# Patient Record
Sex: FEMALE | Race: WHITE | NOT HISPANIC OR LATINO
[De-identification: names, ages, dates, MRNs, and addresses within clinical notes are randomized per-mention and may not be internally consistent; named-entity substitution may affect disease eponyms.]

---

## 2017-01-19 ENCOUNTER — APPOINTMENT (OUTPATIENT)
Dept: ENDOCRINOLOGY | Facility: CLINIC | Age: 52
End: 2017-01-19

## 2017-01-19 VITALS
HEIGHT: 61.5 IN | HEART RATE: 96 BPM | DIASTOLIC BLOOD PRESSURE: 88 MMHG | BODY MASS INDEX: 29.45 KG/M2 | SYSTOLIC BLOOD PRESSURE: 133 MMHG | WEIGHT: 158 LBS

## 2017-01-19 DIAGNOSIS — Z86.59 PERSONAL HISTORY OF OTHER MENTAL AND BEHAVIORAL DISORDERS: ICD-10-CM

## 2017-01-27 LAB
T4 FREE SERPL-MCNC: 1.2 NG/DL
TSH SERPL-ACNC: 1.03 UIU/ML

## 2017-04-07 ENCOUNTER — APPOINTMENT (OUTPATIENT)
Dept: ENDOCRINOLOGY | Facility: CLINIC | Age: 52
End: 2017-04-07

## 2017-07-20 ENCOUNTER — APPOINTMENT (OUTPATIENT)
Dept: ENDOCRINOLOGY | Facility: CLINIC | Age: 52
End: 2017-07-20

## 2017-07-20 VITALS
DIASTOLIC BLOOD PRESSURE: 78 MMHG | SYSTOLIC BLOOD PRESSURE: 115 MMHG | HEART RATE: 66 BPM | BODY MASS INDEX: 29 KG/M2 | WEIGHT: 156 LBS

## 2017-08-04 LAB
25(OH)D3 SERPL-MCNC: 24.8 NG/ML
ALBUMIN SERPL ELPH-MCNC: 4.2 G/DL
ALP BLD-CCNC: 54 U/L
ALT SERPL-CCNC: 12 U/L
ANION GAP SERPL CALC-SCNC: 13 MMOL/L
AST SERPL-CCNC: 16 U/L
BASOPHILS # BLD AUTO: 0.05 K/UL
BASOPHILS NFR BLD AUTO: 0.5 %
BILIRUB SERPL-MCNC: 0.7 MG/DL
BUN SERPL-MCNC: 13 MG/DL
CALCIUM SERPL-MCNC: 9.4 MG/DL
CHLORIDE SERPL-SCNC: 104 MMOL/L
CHOLEST SERPL-MCNC: 230 MG/DL
CHOLEST/HDLC SERPL: 3.6 RATIO
CO2 SERPL-SCNC: 24 MMOL/L
CREAT SERPL-MCNC: 0.82 MG/DL
EOSINOPHIL # BLD AUTO: 0.17 K/UL
EOSINOPHIL NFR BLD AUTO: 1.6 %
FOLATE SERPL-MCNC: >20 NG/ML
GLUCOSE SERPL-MCNC: 92 MG/DL
HBA1C MFR BLD HPLC: 5 %
HCT VFR BLD CALC: 43.2 %
HDLC SERPL-MCNC: 64 MG/DL
HGB BLD-MCNC: 14.2 G/DL
IMM GRANULOCYTES NFR BLD AUTO: 0.2 %
IRON SATN MFR SERPL: 44 %
IRON SERPL-MCNC: 138 UG/DL
LDLC SERPL CALC-MCNC: 143 MG/DL
LYMPHOCYTES # BLD AUTO: 2.13 K/UL
LYMPHOCYTES NFR BLD AUTO: 20.6 %
MAN DIFF?: NORMAL
MCHC RBC-ENTMCNC: 32.6 PG
MCHC RBC-ENTMCNC: 32.9 GM/DL
MCV RBC AUTO: 99.1 FL
MONOCYTES # BLD AUTO: 0.7 K/UL
MONOCYTES NFR BLD AUTO: 6.8 %
NEUTROPHILS # BLD AUTO: 7.29 K/UL
NEUTROPHILS NFR BLD AUTO: 70.3 %
PLATELET # BLD AUTO: 277 K/UL
POTASSIUM SERPL-SCNC: 5.1 MMOL/L
PROT SERPL-MCNC: 6.9 G/DL
RBC # BLD: 4.36 M/UL
RBC # FLD: 13.5 %
SODIUM SERPL-SCNC: 141 MMOL/L
T4 FREE SERPL-MCNC: 1.4 NG/DL
TIBC SERPL-MCNC: 316 UG/DL
TRIGL SERPL-MCNC: 115 MG/DL
TSH SERPL-ACNC: 1.02 UIU/ML
UIBC SERPL-MCNC: 178 UG/DL
VIT B12 SERPL-MCNC: 599 PG/ML
WBC # FLD AUTO: 10.36 K/UL

## 2017-12-04 ENCOUNTER — OTHER (OUTPATIENT)
Age: 52
End: 2017-12-04

## 2018-01-22 ENCOUNTER — LABORATORY RESULT (OUTPATIENT)
Age: 53
End: 2018-01-22

## 2018-01-22 ENCOUNTER — APPOINTMENT (OUTPATIENT)
Dept: ENDOCRINOLOGY | Facility: CLINIC | Age: 53
End: 2018-01-22
Payer: COMMERCIAL

## 2018-01-22 VITALS
SYSTOLIC BLOOD PRESSURE: 122 MMHG | DIASTOLIC BLOOD PRESSURE: 84 MMHG | WEIGHT: 160.25 LBS | BODY MASS INDEX: 29.87 KG/M2 | HEIGHT: 61.5 IN | HEART RATE: 78 BPM

## 2018-01-22 PROCEDURE — 99213 OFFICE O/P EST LOW 20 MIN: CPT | Mod: 25

## 2018-01-22 PROCEDURE — 36415 COLL VENOUS BLD VENIPUNCTURE: CPT

## 2018-02-02 LAB
25(OH)D3 SERPL-MCNC: 26.4 NG/ML
T3FREE SERPL-MCNC: 2.53 PG/ML
T4 FREE SERPL-MCNC: 1.5 NG/DL
THYROGLOB AB SERPL-ACNC: 24.6 IU/ML
THYROGLOB SERPL-MCNC: 255 NG/ML
TSH SERPL-ACNC: 0.94 UIU/ML
TSI ACT/NOR SER: 39 %

## 2018-02-02 RX ORDER — LEVOTHYROXINE SODIUM 0.07 MG/1
75 TABLET ORAL DAILY
Qty: 90 | Refills: 1 | Status: ACTIVE | COMMUNITY
Start: 2017-12-04 | End: 1900-01-01

## 2018-07-16 ENCOUNTER — APPOINTMENT (OUTPATIENT)
Dept: ENDOCRINOLOGY | Facility: CLINIC | Age: 53
End: 2018-07-16

## 2019-01-28 ENCOUNTER — APPOINTMENT (OUTPATIENT)
Dept: ENDOCRINOLOGY | Facility: CLINIC | Age: 54
End: 2019-01-28
Payer: COMMERCIAL

## 2019-01-28 VITALS
HEART RATE: 84 BPM | HEIGHT: 61.5 IN | DIASTOLIC BLOOD PRESSURE: 87 MMHG | BODY MASS INDEX: 28.14 KG/M2 | SYSTOLIC BLOOD PRESSURE: 137 MMHG | WEIGHT: 151 LBS

## 2019-01-28 PROCEDURE — 99214 OFFICE O/P EST MOD 30 MIN: CPT

## 2019-02-02 ENCOUNTER — TRANSCRIPTION ENCOUNTER (OUTPATIENT)
Age: 54
End: 2019-02-02

## 2019-02-08 ENCOUNTER — RX RENEWAL (OUTPATIENT)
Age: 54
End: 2019-02-08

## 2019-02-08 LAB
25(OH)D3 SERPL-MCNC: 24.7 NG/ML
ALBUMIN SERPL ELPH-MCNC: 4.3 G/DL
ALP BLD-CCNC: 66 U/L
ALT SERPL-CCNC: 8 U/L
ANION GAP SERPL CALC-SCNC: 12 MMOL/L
AST SERPL-CCNC: 12 U/L
BASOPHILS # BLD AUTO: 0.07 K/UL
BASOPHILS NFR BLD AUTO: 1.2 %
BILIRUB SERPL-MCNC: 0.2 MG/DL
BUN SERPL-MCNC: 18 MG/DL
CALCIUM SERPL-MCNC: 9.6 MG/DL
CHLORIDE SERPL-SCNC: 103 MMOL/L
CO2 SERPL-SCNC: 28 MMOL/L
CREAT SERPL-MCNC: 0.84 MG/DL
EOSINOPHIL # BLD AUTO: 0.11 K/UL
EOSINOPHIL NFR BLD AUTO: 1.9 %
GLUCOSE SERPL-MCNC: 99 MG/DL
HCT VFR BLD CALC: 45.5 %
HGB BLD-MCNC: 14.4 G/DL
IMM GRANULOCYTES NFR BLD AUTO: 0.2 %
LYMPHOCYTES # BLD AUTO: 1.75 K/UL
LYMPHOCYTES NFR BLD AUTO: 29.5 %
MAN DIFF?: NORMAL
MCHC RBC-ENTMCNC: 31.6 GM/DL
MCHC RBC-ENTMCNC: 32.1 PG
MCV RBC AUTO: 101.3 FL
MONOCYTES # BLD AUTO: 0.41 K/UL
MONOCYTES NFR BLD AUTO: 6.9 %
NEUTROPHILS # BLD AUTO: 3.58 K/UL
NEUTROPHILS NFR BLD AUTO: 60.3 %
PLATELET # BLD AUTO: 306 K/UL
POTASSIUM SERPL-SCNC: 4.3 MMOL/L
PROT SERPL-MCNC: 6.7 G/DL
RBC # BLD: 4.49 M/UL
RBC # FLD: 13.4 %
SODIUM SERPL-SCNC: 143 MMOL/L
T4 FREE SERPL-MCNC: 1.3 NG/DL
TSH SERPL-ACNC: 1.47 UIU/ML
WBC # FLD AUTO: 5.93 K/UL
ZINC SERPL-MCNC: 76 UG/DL

## 2019-08-13 ENCOUNTER — OTHER (OUTPATIENT)
Age: 54
End: 2019-08-13

## 2020-02-20 ENCOUNTER — APPOINTMENT (OUTPATIENT)
Dept: ENDOCRINOLOGY | Facility: CLINIC | Age: 55
End: 2020-02-20
Payer: COMMERCIAL

## 2020-02-20 ENCOUNTER — TRANSCRIPTION ENCOUNTER (OUTPATIENT)
Age: 55
End: 2020-02-20

## 2020-02-20 VITALS
BODY MASS INDEX: 29.26 KG/M2 | HEIGHT: 61.5 IN | WEIGHT: 157 LBS | SYSTOLIC BLOOD PRESSURE: 125 MMHG | HEART RATE: 66 BPM | DIASTOLIC BLOOD PRESSURE: 81 MMHG

## 2020-02-20 DIAGNOSIS — N95.1 MENOPAUSAL AND FEMALE CLIMACTERIC STATES: ICD-10-CM

## 2020-02-20 PROCEDURE — 99214 OFFICE O/P EST MOD 30 MIN: CPT

## 2020-02-20 NOTE — REVIEW OF SYSTEMS
[Fatigue] : fatigue [Negative] : Psychiatric [Palpitations] : no palpitations [Headache] : no headaches [Joint Pain] : no joint pain [Polyuria] : no polyuria [Tremors] : no tremors

## 2020-02-20 NOTE — ASSESSMENT
[Levothyroxine] : The patient was instructed to take Levothyroxine on an empty stomach, separate from vitamins, and wait at least 30 minutes before eating [FreeTextEntry1] : 52 y/o history\par #1-  hypothyroidism,  she's clinically euthyroid however she is complaining of increased tiredness this might be related to insomnia and increased level of the stress at work.\par She noted hair thinning for the past couple of months\par Check levels of thyroid, send a CBC, and Zinc\par She is on Synthroid where review of laboratory results before next prescription\par \par #2- Right 1.7 cm cystic nodule\par No signs of upper respiratory obstruction, she appears euthyroid clinically\par Order a thyroid ultrasound\par \par

## 2020-02-20 NOTE — HISTORY OF PRESENT ILLNESS
[FreeTextEntry1] : 54 y/o female , Hashimoto's hypothyroidism dx in 2014\par Menopause, no history of bone fracture\par recently diagnosed with high blood pressure prescribed Norvasc\par \par She's complaining of increased level of tiredness and hair thinning since Nov 2018\par She's also complaining of insomnia, and weight gain\par She's too busy at work\par Denies swallowing difficulties, voice changes\par Medicines Synthroid 75 mcg, at times she takes multivitamins\par \par 12/11/15, thyroid u/s : Heterogeneous pattern, no nodules\par 1/19/17 , t4 free 1.2, tsh 1.03\par 1/22/18 free T4-1 0.5, TPO 44 and vitamin D 26.4

## 2020-02-21 PROBLEM — N95.1 PERIMENOPAUSAL: Status: ACTIVE | Noted: 2020-02-21

## 2020-02-21 LAB
25(OH)D3 SERPL-MCNC: 24.2 NG/ML
CHOLEST SERPL-MCNC: 238 MG/DL
CHOLEST/HDLC SERPL: 4.1 RATIO
HDLC SERPL-MCNC: 59 MG/DL
LDLC SERPL CALC-MCNC: 158 MG/DL
T4 FREE SERPL-MCNC: 1.3 NG/DL
TRIGL SERPL-MCNC: 108 MG/DL
TSH SERPL-ACNC: 1.34 UIU/ML

## 2020-02-21 NOTE — ADDENDUM
[FreeTextEntry1] : I, Vanessa Kohli, acted solely as a scribe for Dr. Dwight Hargrove on this date. 02/20/2020.

## 2020-02-21 NOTE — PHYSICAL EXAM
[Alert] : alert [Normal Sclera/Conjunctiva] : normal sclera/conjunctiva [Clear to Auscultation] : lungs were clear to auscultation bilaterally [No Edema] : there was no peripheral edema [Not Distended] : not distended [No Rash] : no rash [No Stigmata of Cushings Syndrome] : no stigmata of cushings syndrome [No Tremors] : no tremors [Oriented x3] : oriented to person, place, and time [Normal Outer Ear/Nose] : the ears and nose were normal in appearance [No Respiratory Distress] : no respiratory distress [Normal Rate] : heart rate was normal  [Normal Bowel Sounds] : normal bowel sounds [Regular Rhythm] : with a regular rhythm [Normal S1, S2] : normal S1 and S2 [Acanthosis Nigricans] : no acanthosis nigricans [de-identified] : mass palpated in R lobe

## 2020-02-21 NOTE — HISTORY OF PRESENT ILLNESS
[FreeTextEntry1] : - 12/11/15 Thyroid US: Heterogeneous pattern, no nodules\par - 1/19/17: TSH 1.03, Free T4 1.2, Cholesterol 230, LDL-c 143\par - 1/22/18: TSH 0.94, Free T4 1.5, TPO 44, Vit D 25-OH 26.4 \par - 1/28/19: TSH 1.47, Free T4 1.3, s.creat 0.84, Vit D 25-OH 24.7\par \par 53 y/o F pt, with Hx of Hypothyroidism (dx in 2014), with elevated total cholesterol level 230 on 1/19/2017.\par Other PMHx: HTN\par No Hx of bone fracture.\par SHx: Non-smoker\par Last PCP visit: recently; she was informed that her BP was elevated \par \par 02/20/2020 \par Pt presents today for thyroid f/u,feeling generally well with c/o cold intolerance, hot flushes, night sweats, and occasional diarrhea. Pt reports that she still gets her menses but her recent menstrual periods has been inconsistent. Pt is not consistent with taking Vitamin D supplements. Her PCP reportedly ordered labs on 1/2020 and she was informed that they were all normal.  \par Denies swallowing difficulties, voice changes, and palpitations. \par \par Current  Medications: Synthroid

## 2020-02-21 NOTE — END OF VISIT
[>50% of Time Spent on Counseling for ____] : Greater than 50% of the encounter time was spent on counseling for [unfilled] [Time Spent: ___ minutes] : I have spent [unfilled] minutes of face to face time with the patient [FreeTextEntry3] : All medical record entries made by the Scribe were at my, Dr. Dwight Hargrove, direction and personally dictated by me on 02/20/2020. I have reviewed the chart and agree that the record accurately reflects my personal performance of the history, physical exam, assessment and plan. I have also personally directed, reviewed and agreed with the chart.

## 2020-02-21 NOTE — ASSESSMENT
[Levothyroxine] : The patient was instructed to take Levothyroxine on an empty stomach, separate from vitamins, and wait at least 30 minutes before eating [FreeTextEntry1] : 55 y/o F with:\par \par 1. Hx of hypothyroidism (dx in 2014); recent TSH 1.47 on 1/28/19:\par Pt is clinically euthyroid. Previous lipid profile from 1/19/17 shows elevated total cholesterol.\par Pt does not exhibit signs and symptoms  of any other autoimmune endocrine related disorders. \par Ordered labs today including TFTs and lipid profile. \par \par 2. R thyroid mass, previously identified as R lobe 1.7 cm cystic nodule:\par Ordered thyroid US.\par \par 3. Perimenopausal\par Risk associated with osteopenia described to pt. Advised pt to consistently take Vitamin D and Calcium supplements.\par \par 4. Hyperlipidemia, she is at increase risk for ASCVD\par Recommend life style modification and consider statins \par Return in: 4 months

## 2020-07-08 ENCOUNTER — APPOINTMENT (OUTPATIENT)
Dept: ENDOCRINOLOGY | Facility: CLINIC | Age: 55
End: 2020-07-08

## 2020-12-28 ENCOUNTER — APPOINTMENT (OUTPATIENT)
Dept: ULTRASOUND IMAGING | Facility: CLINIC | Age: 55
End: 2020-12-28
Payer: COMMERCIAL

## 2020-12-28 ENCOUNTER — OUTPATIENT (OUTPATIENT)
Dept: OUTPATIENT SERVICES | Facility: HOSPITAL | Age: 55
LOS: 1 days | End: 2020-12-28

## 2020-12-28 ENCOUNTER — APPOINTMENT (OUTPATIENT)
Dept: MAMMOGRAPHY | Facility: CLINIC | Age: 55
End: 2020-12-28
Payer: COMMERCIAL

## 2020-12-28 ENCOUNTER — RESULT REVIEW (OUTPATIENT)
Age: 55
End: 2020-12-28

## 2020-12-28 PROCEDURE — 77063 BREAST TOMOSYNTHESIS BI: CPT | Mod: 26

## 2020-12-28 PROCEDURE — 76536 US EXAM OF HEAD AND NECK: CPT | Mod: 26

## 2020-12-28 PROCEDURE — 77067 SCR MAMMO BI INCL CAD: CPT | Mod: 26

## 2020-12-28 PROCEDURE — 76641 ULTRASOUND BREAST COMPLETE: CPT | Mod: 26,50

## 2021-01-02 ENCOUNTER — TRANSCRIPTION ENCOUNTER (OUTPATIENT)
Age: 56
End: 2021-01-02

## 2021-02-11 ENCOUNTER — NON-APPOINTMENT (OUTPATIENT)
Age: 56
End: 2021-02-11

## 2021-02-17 ENCOUNTER — TRANSCRIPTION ENCOUNTER (OUTPATIENT)
Age: 56
End: 2021-02-17

## 2021-03-11 ENCOUNTER — APPOINTMENT (OUTPATIENT)
Dept: ENDOCRINOLOGY | Facility: CLINIC | Age: 56
End: 2021-03-11
Payer: COMMERCIAL

## 2021-03-11 VITALS
BODY MASS INDEX: 28.44 KG/M2 | DIASTOLIC BLOOD PRESSURE: 87 MMHG | WEIGHT: 153 LBS | HEART RATE: 97 BPM | SYSTOLIC BLOOD PRESSURE: 120 MMHG

## 2021-03-11 DIAGNOSIS — E04.1 NONTOXIC SINGLE THYROID NODULE: ICD-10-CM

## 2021-03-11 PROCEDURE — 99214 OFFICE O/P EST MOD 30 MIN: CPT | Mod: 25

## 2021-03-11 PROCEDURE — 99072 ADDL SUPL MATRL&STAF TM PHE: CPT

## 2021-03-11 PROCEDURE — 36415 COLL VENOUS BLD VENIPUNCTURE: CPT

## 2021-03-12 PROBLEM — E04.1 THYROID NODULE, UNINODULAR: Status: ACTIVE | Noted: 2019-01-28

## 2021-03-12 NOTE — ASSESSMENT
[FreeTextEntry1] : 54 y/o F with:\par \par 1. Hypothyroidism (dx in 2014); recent TSH 1.47 on 1/28/19:\par She is on Synthroid 75 mcg. She seems clinically euthyroid. She recently noticed hair thinning. \par Sent TFTs and Ferritin level.  \par \par 2. R thyroid mass, previously identified as R lobe 1.7 cm cystic nodule:\par Unremarkable 12/2020 thyroid US unremarkable (will contact radiologist). \par \par 3. Hyperlipidemia\par She is at increased risk for ASCVD. Recommend life style modification\par Sent metabolic and lipid profiles. \par \par Return in: 4 months  [Levothyroxine] : The patient was instructed to take Levothyroxine on an empty stomach, separate from vitamins, and wait at least 30 minutes before eating

## 2021-03-12 NOTE — END OF VISIT
[FreeTextEntry3] : All medical record entries made by the Scribe were at my, Dr. Dwight Hargrove, direction and personally dictated by me on 03/11/2021. I have reviewed the chart and agree that the record accurately reflects my personal performance of the history, physical exam, assessment and plan. I have also personally directed, reviewed and agreed with the chart.  [Time Spent: ___ minutes] : I have spent [unfilled] minutes of time on the encounter.

## 2021-03-12 NOTE — PHYSICAL EXAM
[Alert] : alert [Normal Sclera/Conjunctiva] : normal sclera/conjunctiva [Normal Outer Ear/Nose] : the ears and nose were normal in appearance [No Respiratory Distress] : no respiratory distress [Clear to Auscultation] : lungs were clear to auscultation bilaterally [Normal Rate] : heart rate was normal [Regular Rhythm] : with a regular rhythm [No Edema] : no peripheral edema [Normal Bowel Sounds] : normal bowel sounds [Not Distended] : not distended [No Stigmata of Cushings Syndrome] : no stigmata of Cushings Syndrome [No Rash] : no rash [Oriented x3] : oriented to person, place, and time [Acanthosis Nigricans] : no acanthosis nigricans [de-identified] : R lobe palpated

## 2021-03-12 NOTE — HISTORY OF PRESENT ILLNESS
[FreeTextEntry1] : 54y/o F pt, with Hx of Hypothyroidism (dx in 2014), and Hx of thyroid nodule.\par Other PMHx: HTN, HLD\par No Hx of bone fracture.\par SHx: Non-smoker\par Last PCP visit: 2019\par \par 03/11/2021 \par Pt presents today with /87 and BMI 28.44 for thyroid f/u. She is generally feeling well with c/o hair loss. Pt reports of experiencing sudden shakiness and feeling hypoglycemic and hungry ~1 month ago. She states she might have delayed her meal at that time. She has lost 4 lbs in last 12 months. \par Denies swallowing, breathing and speech difficulties, loss of muscle strength and memory impairment. \par \par Current Medications: Synthroid 75 mcg qd, Lexapro 20 mg, Norvasc 2.5 mg\par \par Labs:\par - 12/28/20 Thyroid US compared to study on 12/17/13: R and L lobes are both diffusely heterogeneous in appearance with numerous small hypoechoic foci, consistent with Hashimoto's thyroiditis. No dominant or definite suspicious lesions. \par - 02/20/20: TSH 1.34, Free T4 1.3, Vit D 25-OH 24.2, Cholesterol 238, LDL-c 158\par - 01/28/19: TSH 1.47, Free T4 1.3, Vit D 25-OH 24.7\par - 01/22/18: TSH 0.94, Free T4 1.5, TPO Ab 44, Vit D 25-OH 26.4 \par - 01/19/17: TSH 1.03, Free T4 1.2, Cholesterol 230, LDL-c 143\par - 12/17/13 Thyroid US: R lobe is heterogenous and hypervascular, and contains one mid 1.7 x 0.6 x 1.4 cm cystic, containing thin internal septations, anechoic, ovoid, smooth and well defined, danyel -nodular vascularity nodule. L lobe is heterogeneous and hypervascular with no visible nodules.

## 2021-03-12 NOTE — REVIEW OF SYSTEMS
[Hair Loss] : hair loss [As Noted in HPI] : as noted in HPI [Negative] : Heme/Lymph [Dysphagia] : no dysphagia [Dysphonia] : no dysphonia [Difficulty Breathing] : no dyspnea [Muscle Weakness] : no muscle weakness

## 2021-03-19 ENCOUNTER — RX RENEWAL (OUTPATIENT)
Age: 56
End: 2021-03-19

## 2021-04-01 ENCOUNTER — APPOINTMENT (OUTPATIENT)
Dept: ENDOCRINOLOGY | Facility: CLINIC | Age: 56
End: 2021-04-01
Payer: COMMERCIAL

## 2021-04-01 VITALS
DIASTOLIC BLOOD PRESSURE: 81 MMHG | HEART RATE: 87 BPM | BODY MASS INDEX: 27.77 KG/M2 | SYSTOLIC BLOOD PRESSURE: 115 MMHG | HEIGHT: 61.5 IN | WEIGHT: 149 LBS

## 2021-04-01 DIAGNOSIS — E78.5 HYPERLIPIDEMIA, UNSPECIFIED: ICD-10-CM

## 2021-04-01 PROCEDURE — 99214 OFFICE O/P EST MOD 30 MIN: CPT

## 2021-04-01 PROCEDURE — 99072 ADDL SUPL MATRL&STAF TM PHE: CPT

## 2021-04-01 RX ORDER — ATORVASTATIN CALCIUM 10 MG/1
10 TABLET, FILM COATED ORAL
Qty: 90 | Refills: 2 | Status: ACTIVE | COMMUNITY
Start: 2021-04-01 | End: 1900-01-01

## 2021-04-02 PROBLEM — E78.5 HYPERLIPEMIA: Status: ACTIVE | Noted: 2020-02-21

## 2021-04-02 NOTE — ADDENDUM
[FreeTextEntry1] : I, Vanessa Kohli, acted solely as a scribe for Dr. Dwight Hargrove on this date. 04/01/2021.

## 2021-04-02 NOTE — END OF VISIT
[FreeTextEntry3] : All medical record entries made by the Scribe were at my, Dr. Dwight Hargrove, direction and personally dictated by me on 04/01/2021. I have reviewed the chart and agree that the record accurately reflects my personal performance of the history, physical exam, assessment and plan. I have also personally directed, reviewed and agreed with the chart.  [Time Spent: ___ minutes] : I have spent [unfilled] minutes of time on the encounter.

## 2021-04-02 NOTE — PHYSICAL EXAM
[Alert] : alert [Normal Sclera/Conjunctiva] : normal sclera/conjunctiva [Normal Outer Ear/Nose] : the ears and nose were normal in appearance [No Respiratory Distress] : no respiratory distress [Clear to Auscultation] : lungs were clear to auscultation bilaterally [Normal Rate] : heart rate was normal [Regular Rhythm] : with a regular rhythm [No Edema] : no peripheral edema [Normal Bowel Sounds] : normal bowel sounds [Not Distended] : not distended [No Stigmata of Cushings Syndrome] : no stigmata of Cushings Syndrome [No Rash] : no rash [Oriented x3] : oriented to person, place, and time [Acanthosis Nigricans] : no acanthosis nigricans [de-identified] : R lobe palpated

## 2021-04-02 NOTE — REVIEW OF SYSTEMS
[Recent Weight Loss (___ Lbs)] : recent weight loss: [unfilled] lbs [Hair Loss] : hair loss [As Noted in HPI] : as noted in HPI [Negative] : Heme/Lymph [Dysphagia] : no dysphagia [Dysphonia] : no dysphonia [Difficulty Breathing] : no dyspnea [Muscle Weakness] : no muscle weakness

## 2021-04-02 NOTE — ASSESSMENT
[FreeTextEntry1] : 54 y/o F with:\par \par 1. Hypothyroidism (dx in 2014); recent TSH 1.47 on 1/28/19:\par Pt is clinically and biochemically euthyroid. \par Continue with Synthroid 75 mcg qd. \par \par 2. Hypoglycemias and weakness: \par Cause by prolonged fasting and to delayed meals. \par BS monitor readings reveal normal pre and postprandial glucose. \par \par 3. Hyperlipidemia\par She is at increased risk for ASCVD. Recommend life style modification.\par Will initiate pt ton Atorvastatin 10 mg. \par Repeat LFTs in 6 weeks. \par \par 2. R thyroid mass, previously identified as R lobe 1.7 cm cystic nodule:\par Thyroid US on 12/2020 reveals R lobe with no mass or nodules. R thyroid mass continues to be palpated upon physical exam today. Therefore, will order high resolution thyroid US.\par \par Return in: 3 months [Hypoglycemia Management] : hypoglycemia management

## 2021-04-02 NOTE — HISTORY OF PRESENT ILLNESS
[FreeTextEntry1] : 54y/o F pt, with Hx of Hypothyroidism (dx in 2014), and Hx of thyroid nodule.\par Other PMHx: HTN, HLD\par No Hx of bone fracture.\par SHx: Non-smoker\par Last PCP visit: 2019\par \par 03/11/2021 \par Pt presents today with /87 and BMI 28.44 for thyroid f/u. She is generally feeling well with c/o hair loss. Pt reports of experiencing sudden shakiness and feeling hypoglycemic and hungry ~1 month ago. She states she might have delayed her meal at that time. She has lost 4 lbs in last 12 months. \par Denies swallowing, breathing and speech difficulties, loss of muscle strength and memory impairment. \par \par 04/01/2021 \par - Review of labs: LDL-c higher than 100 for past couple of years. LDL-c 164 on 3/11/21. \par \par Pt presents today with POCT 100, /81 and BMI 27.7 for thyroid f/u. She is feeling clinically unchanged since her last visit. \par Pt brought her BS monitor. BS: 82, 124, 92, 84, 96, 105, 89, 90, 112, 81, 91. \par She has lost 4 lbs in last 1 month. She has not been exercising. \par \par Current Medications: Synthroid 75 mcg qd, Lexapro 20 mg, Norvasc 2.5 mg\par \par Labs:\par - 03/11/21: TSH 0.39, Free T4 1.4, TPO Ab 134, Thyroglobulin Ab 43.6, Vit D 25-OH 39.1, A1c 5.1%, s.creat 0.83, Cholesterol 244, LDL-c 164\par - 12/28/20 Thyroid US: R and L lobes are both diffusely heterogeneous in appearance with numerous small hypoechoic foci, consistent with Hashimoto's thyroiditis. No dominant or definite suspicious lesions. \par - 02/20/20: TSH 1.34, Free T4 1.3, Vit D 25-OH 24.2, Cholesterol 238, LDL-c 158\par - 01/28/19: TSH 1.47, Free T4 1.3, Vit D 25-OH 24.7\par - 01/22/18: TSH 0.94, Free T4 1.5, TPO Ab 44, Vit D 25-OH 26.4 \par - 01/19/17: TSH 1.03, Free T4 1.2, Cholesterol 230, LDL-c 143\par - 12/17/13 Thyroid US: R lobe is heterogenous and hypervascular, and contains one mid 1.7 x 0.6 x 1.4 cm cystic, containing thin internal septations, anechoic, ovoid, smooth and well defined, danyel -nodular vascularity nodule. L lobe is heterogeneous and hypervascular with no visible nodules.

## 2021-04-03 ENCOUNTER — APPOINTMENT (OUTPATIENT)
Dept: ULTRASOUND IMAGING | Facility: HOSPITAL | Age: 56
End: 2021-04-03
Payer: COMMERCIAL

## 2021-04-03 ENCOUNTER — OUTPATIENT (OUTPATIENT)
Dept: OUTPATIENT SERVICES | Facility: HOSPITAL | Age: 56
LOS: 1 days | End: 2021-04-03
Payer: COMMERCIAL

## 2021-04-03 PROCEDURE — 76536 US EXAM OF HEAD AND NECK: CPT

## 2021-04-03 PROCEDURE — 76536 US EXAM OF HEAD AND NECK: CPT | Mod: 26

## 2021-04-07 ENCOUNTER — TRANSCRIPTION ENCOUNTER (OUTPATIENT)
Age: 56
End: 2021-04-07

## 2021-04-07 LAB
25(OH)D3 SERPL-MCNC: 39.1 NG/ML
ALBUMIN SERPL ELPH-MCNC: 4.6 G/DL
ALP BLD-CCNC: 63 U/L
ALT SERPL-CCNC: 9 U/L
ANION GAP SERPL CALC-SCNC: 16 MMOL/L
AST SERPL-CCNC: 19 U/L
BILIRUB SERPL-MCNC: 0.3 MG/DL
BUN SERPL-MCNC: 13 MG/DL
CALCIUM SERPL-MCNC: 9.7 MG/DL
CHLORIDE SERPL-SCNC: 104 MMOL/L
CHOLEST SERPL-MCNC: 244 MG/DL
CO2 SERPL-SCNC: 23 MMOL/L
CREAT SERPL-MCNC: 0.83 MG/DL
ESTIMATED AVERAGE GLUCOSE: 100 MG/DL
FERRITIN SERPL-MCNC: 60 NG/ML
FOLATE SERPL-MCNC: >20 NG/ML
GLUCOSE BLDC GLUCOMTR-MCNC: 100
GLUCOSE SERPL-MCNC: 66 MG/DL
HBA1C MFR BLD HPLC: 5.1 %
HDLC SERPL-MCNC: 57 MG/DL
LDLC SERPL CALC-MCNC: 164 MG/DL
NONHDLC SERPL-MCNC: 187 MG/DL
POTASSIUM SERPL-SCNC: 4.7 MMOL/L
PROT SERPL-MCNC: 7 G/DL
SODIUM SERPL-SCNC: 143 MMOL/L
T4 FREE SERPL-MCNC: 1.4 NG/DL
THYROGLOB AB SERPL-ACNC: 43.6 IU/ML
THYROPEROXIDASE AB SERPL IA-ACNC: 134 IU/ML
TRIGL SERPL-MCNC: 115 MG/DL
TSH SERPL-ACNC: 0.39 UIU/ML
VIT B12 SERPL-MCNC: 477 PG/ML

## 2021-07-22 ENCOUNTER — APPOINTMENT (OUTPATIENT)
Dept: ENDOCRINOLOGY | Facility: CLINIC | Age: 56
End: 2021-07-22
Payer: COMMERCIAL

## 2021-07-22 VITALS
WEIGHT: 147 LBS | DIASTOLIC BLOOD PRESSURE: 84 MMHG | HEIGHT: 61.5 IN | SYSTOLIC BLOOD PRESSURE: 118 MMHG | BODY MASS INDEX: 27.4 KG/M2 | HEART RATE: 94 BPM

## 2021-07-22 DIAGNOSIS — E03.9 HYPOTHYROIDISM, UNSPECIFIED: ICD-10-CM

## 2021-07-22 DIAGNOSIS — E07.9 DISORDER OF THYROID, UNSPECIFIED: ICD-10-CM

## 2021-07-22 LAB — GLUCOSE BLDC GLUCOMTR-MCNC: 95

## 2021-07-22 PROCEDURE — 99072 ADDL SUPL MATRL&STAF TM PHE: CPT

## 2021-07-22 PROCEDURE — 82962 GLUCOSE BLOOD TEST: CPT

## 2021-07-22 PROCEDURE — 99213 OFFICE O/P EST LOW 20 MIN: CPT | Mod: 25

## 2021-07-22 NOTE — REVIEW OF SYSTEMS
[Recent Weight Loss (___ Lbs)] : recent weight loss: [unfilled] lbs [As Noted in HPI] : as noted in HPI [Diarrhea] : diarrhea [Hair Loss] : hair loss [Negative] : Heme/Lymph

## 2021-07-23 NOTE — ASSESSMENT
[FreeTextEntry1] : 54 y/o F with:\par \par 1. Hashimoto Hypothyroidism (dx in 2014, TPO Ab 134 on 3/2021):\par No signs and symptoms of any other autoimmune endocrine related disorders. \par She is on Synthroid 75 mcg qd. Pt is clinically euthyroid.\par \par 2. R thyroid lobe palpated:\par Two most recent thyroid US report indicate no thyroid nodules and no anterior neck mass. However, pt states that her R side of thyroid is getting larger and larger. Therefore, referred pt to ENT for evaluation. \par \par Return in: 1 year.  [Levothyroxine] : The patient was instructed to take Levothyroxine on an empty stomach, separate from vitamins, and wait at least 30 minutes before eating

## 2021-07-23 NOTE — END OF VISIT
[FreeTextEntry3] : All medical record entries made by the Scribe were at my, Dr. Dwight Hargrove, direction and personally dictated by me on 07/22/2021. I have reviewed the chart and agree that the record accurately reflects my personal performance of the history, physical exam, assessment and plan. I have also personally directed, reviewed and agreed with the chart.  [Time Spent: ___ minutes] : I have spent [unfilled] minutes of time on the encounter.

## 2021-07-23 NOTE — HISTORY OF PRESENT ILLNESS
[FreeTextEntry1] : 56 y/o F pt, with Hx of Hashimoto Hypothyroidism (dx in 2014), Hx of thyroid nodule, and Hx of hypoglycemias. \par Other PMHx: HTN, HLD\par No Hx of bone fracture.\par SHx: Non-smoker\par Last PCP visit: 2019\par \par 07/22/2021\par Pt presents today with POCT 95, /84 and BMI 27.33 for thyroid f/u, with c/o hair loss. \par She has lost 10 lbs in last 1.5 yrs. She is scheduled to see GI next week for L lower flank discomfort that has been occurring for ~ 3 yrs but has worsened recently. Pt also reports of frequent loss stool. \par \par Current Medications: Synthroid 75 mcg qd, Atorvastatin 10 mg (initiated last visit 4/2021), Lexapro 20 mg, Norvasc 2.5 mg\par \par Labs:\par - 04/03/21 Thyroid US: 1. Since 12/17/2013, persistent heterogeneous and hypervascular thyroid gland, consistent with Hashimoto's thyroiditis. 2. Since 12/20/2020, minimal interval increase in enlarged right thyroid lobe. Interval decrease in the size of the left thyroid gland. 3. No suspicious nodules or lymphadenopathy.  \par - 03/11/21: TSH 0.39, Free T4 1.4, TPO Ab 134, Thyroglobulin Ab 43.6, Vit D 25-OH 39.1, A1c 5.1%, s.creat 0.83, Cholesterol 244, LDL-c 164\par - 12/28/20 Thyroid US: R and L lobes are both diffusely heterogeneous in appearance with numerous small hypoechoic foci, consistent with Hashimoto's thyroiditis. No dominant or definite suspicious lesions. \par - 02/20/20: TSH 1.34, Free T4 1.3, Vit D 25-OH 24.2, Cholesterol 238, LDL-c 158\par - 01/28/19: TSH 1.47, Free T4 1.3, Vit D 25-OH 24.7\par - 01/22/18: TSH 0.94, Free T4 1.5, TPO Ab 44, Vit D 25-OH 26.4 \par - 01/19/17: TSH 1.03, Free T4 1.2, Cholesterol 230, LDL-c 143\par - 12/17/13 Thyroid US: R lobe is heterogenous and hypervascular, and contains one mid 1.7 x 0.6 x 1.4 cm cystic, containing thin internal septations, anechoic, ovoid, smooth and well defined, danyel -nodular vascularity nodule. L lobe is heterogeneous and hypervascular with no visible nodules.

## 2021-07-23 NOTE — PHYSICAL EXAM
[Alert] : alert [Normal Sclera/Conjunctiva] : normal sclera/conjunctiva [Normal Outer Ear/Nose] : the ears and nose were normal in appearance [No Respiratory Distress] : no respiratory distress [Clear to Auscultation] : lungs were clear to auscultation bilaterally [Normal Rate] : heart rate was normal [Regular Rhythm] : with a regular rhythm [No Edema] : no peripheral edema [Normal Bowel Sounds] : normal bowel sounds [Not Distended] : not distended [No Stigmata of Cushings Syndrome] : no stigmata of Cushings Syndrome [No Rash] : no rash [Oriented x3] : oriented to person, place, and time [Acanthosis Nigricans] : no acanthosis nigricans [de-identified] : R lobe palpated

## 2021-07-23 NOTE — ADDENDUM
[FreeTextEntry1] : I, Vanessa Kohli, acted solely as a scribe for Dr. Dwight Hargrove on this date. 07/22/2021.

## 2021-08-11 ENCOUNTER — TRANSCRIPTION ENCOUNTER (OUTPATIENT)
Age: 56
End: 2021-08-11

## 2021-08-12 ENCOUNTER — TRANSCRIPTION ENCOUNTER (OUTPATIENT)
Age: 56
End: 2021-08-12

## 2021-10-05 ENCOUNTER — RX RENEWAL (OUTPATIENT)
Age: 56
End: 2021-10-05

## 2021-10-05 ENCOUNTER — TRANSCRIPTION ENCOUNTER (OUTPATIENT)
Age: 56
End: 2021-10-05

## 2021-10-05 RX ORDER — LEVOTHYROXINE SODIUM 0.07 MG/1
75 TABLET ORAL
Qty: 90 | Refills: 0 | Status: ACTIVE | COMMUNITY
Start: 2018-01-23 | End: 1900-01-01

## 2021-11-07 ENCOUNTER — TRANSCRIPTION ENCOUNTER (OUTPATIENT)
Age: 56
End: 2021-11-07